# Patient Record
Sex: FEMALE | Race: WHITE
[De-identification: names, ages, dates, MRNs, and addresses within clinical notes are randomized per-mention and may not be internally consistent; named-entity substitution may affect disease eponyms.]

---

## 2021-06-09 PROBLEM — Z00.00 ENCOUNTER FOR PREVENTIVE HEALTH EXAMINATION: Status: ACTIVE | Noted: 2021-06-09

## 2021-06-14 ENCOUNTER — APPOINTMENT (OUTPATIENT)
Dept: OBGYN | Facility: CLINIC | Age: 21
End: 2021-06-14

## 2023-01-16 ENCOUNTER — APPOINTMENT (OUTPATIENT)
Dept: ENDOCRINOLOGY | Facility: CLINIC | Age: 23
End: 2023-01-16
Payer: MEDICAID

## 2023-01-16 ENCOUNTER — TRANSCRIPTION ENCOUNTER (OUTPATIENT)
Age: 23
End: 2023-01-16

## 2023-01-16 VITALS
HEART RATE: 97 BPM | OXYGEN SATURATION: 98 % | SYSTOLIC BLOOD PRESSURE: 120 MMHG | WEIGHT: 132 LBS | DIASTOLIC BLOOD PRESSURE: 80 MMHG | HEIGHT: 58 IN | BODY MASS INDEX: 27.71 KG/M2

## 2023-01-16 DIAGNOSIS — Z13.29 ENCOUNTER FOR SCREENING FOR OTHER SUSPECTED ENDOCRINE DISORDER: ICD-10-CM

## 2023-01-16 LAB — GLUCOSE BLDC GLUCOMTR-MCNC: 117

## 2023-01-16 PROCEDURE — G0108 DIAB MANAGE TRN  PER INDIV: CPT

## 2023-01-16 PROCEDURE — 99203 OFFICE O/P NEW LOW 30 MIN: CPT | Mod: 25

## 2023-01-16 PROCEDURE — 82962 GLUCOSE BLOOD TEST: CPT

## 2023-01-16 RX ORDER — URINE ACETONE TEST STRIPS
STRIP MISCELLANEOUS
Qty: 1 | Refills: 1 | Status: ACTIVE | COMMUNITY
Start: 2023-01-16 | End: 1900-01-01

## 2023-01-16 RX ORDER — LANCETS
EACH MISCELLANEOUS
Qty: 4 | Refills: 1 | Status: ACTIVE | COMMUNITY
Start: 2023-01-16 | End: 1900-01-01

## 2023-01-16 RX ORDER — TERCONAZOLE 4 MG/G
0.4 CREAM VAGINAL
Qty: 45 | Refills: 0 | Status: DISCONTINUED | COMMUNITY
Start: 2022-12-14

## 2023-01-16 RX ORDER — CHOLECALCIFEROL (VITAMIN D3) 25 MCG
25 MCG CAPSULE ORAL
Qty: 30 | Refills: 0 | Status: ACTIVE | COMMUNITY
Start: 2022-08-24

## 2023-01-16 RX ORDER — AZITHROMYCIN 500 MG/1
500 TABLET, FILM COATED ORAL
Qty: 5 | Refills: 0 | Status: DISCONTINUED | COMMUNITY
Start: 2022-09-30

## 2023-01-16 RX ORDER — BLOOD SUGAR DIAGNOSTIC
STRIP MISCELLANEOUS 4 TIMES DAILY
Qty: 4 | Refills: 1 | Status: ACTIVE | COMMUNITY
Start: 2023-01-16 | End: 1900-01-01

## 2023-01-16 NOTE — REVIEW OF SYSTEMS
[Fatigue] : fatigue [Recent Weight Gain (___ Lbs)] : recent weight gain: [unfilled] lbs [Decreased Appetite] : appetite not decreased [Visual Field Defect] : no visual field defect [Blurred Vision] : no blurred vision [Dysphagia] : no dysphagia [Neck Pain] : no neck pain [Dysphonia] : no dysphonia [Chest Pain] : no chest pain [Palpitations] : no palpitations [Constipation] : no constipation [Diarrhea] : no diarrhea [Polyuria] : no polyuria [Dysuria] : no dysuria [Headaches] : no headaches [Tremors] : no tremors [Depression] : no depression [Anxiety] : no anxiety [Polydipsia] : no polydipsia [Swelling] : no swelling

## 2023-01-16 NOTE — CONSULT LETTER
[Dear  ___] : Dear  [unfilled], [Consult Letter:] : I had the pleasure of evaluating your patient, [unfilled]. [Consult Closing:] : Thank you very much for allowing me to participate in the care of this patient.  If you have any questions, please do not hesitate to contact me. [Sincerely,] : Sincerely, [FreeTextEntry1] : At the visit with myself and our dietician, we explained impaired glucose tolerance and gestational diabetes in detail giving her printed information as well. We placed her on a gestational diabetic diet restricting her carbohydrate intake to 15 grams at breakfast. She was given a sample diet and instructed on carbohydrate counting. We instructed her on the use of a blood glucose monitor and she was asked to monitor her blood sugars before breakfast and one hour after each meal. \par I\par f her blood sugars remain in our target goal of 90 or less fasting and below 120 post- prandially, we will continue to follow her closely on diet management. If, however, her blood sugars rise above these numbers, we will, of course, institute drug therapy.\par \par We have asked her to contact us with her blood glucose numbers in two to three days and to schedule an office follow-up in several weeks. \par \par  [FreeTextEntry3] : Katarzyna Rincon, FNP-BC

## 2023-01-16 NOTE — ASSESSMENT
[FreeTextEntry1] : 21 y/o female  with GDM.\par \par Plan: start self blood sugar monitoring 4 times a day along with sending in logs on Thursday. Check A1c and TFTs.\par \par Diet (carbohydrates 15 grams with breakfast, 45 grams with lunch and 60 grams with dinner, 15-30 grams with snacks. Balance with lean proteins, high fiber and low fat diet.)\par \par Exercise daily as tolerated, work up to 20- 30 minutes a day. \par \par Medication, risks and benefits reviewed. ***Glucose testing and insulin/ medication *** administration for glycemic management  \par \par GDM Education:  Discussed the immediate risks to the mother with GDM are an increased incidence of  delivery (~30%), preeclampsia (~20-30%), and polyhydramnios (~20%) which can result in  labor.  The long-term risks to the mother regarding diabetes complications during pregnancy.  Counseling on diet, and exercise was given.  Fetal/Infant risks discussed and include macrosomia,  risk for shoulder dystocia/Erb's palsy, clavicular fractures, fetal distress, low APGAR scores, and even birth asphyxia when unrecognized, respiratory distress syndrome may occur in up to 31% of infants while cardiac septal hypertrophy may be seen in 35-40%., with extremely poor glucose control, there is also an increased risk of fetal mortality due to fetal acidemia and hypoxia. Common metabolic abnormalities in the infant of a diabetic mother include  hypoglycemia, hypocalcemia, hyperbilirubinemia and polycythemia.  hypoglycemia is common in women in suboptimal glycemic control. Also the long-term sequelae of hyperglycemia during pregnancy for offspring and increased risk of adolescent obesity and of Type 2 diabetes. \par \par Breastfeeding encouraged. Fetal surveillance as per OB recommendations (fetal ultrasound, fetal movement records, nonstress test, biophysical profile)\par \par Follow-up in 1 to 2 weeks; call if develop other problems/ questions. Call to report hypoglycemia or sustained hyperglycemia.\par

## 2023-01-16 NOTE — HISTORY OF PRESENT ILLNESS
[FreeTextEntry1] : Quality: GDM\par Severity: moderate \par Onset: 28 weeks gestation \par Glucose tolerance test results: 22  2 hr gtt 85/180/125\par History of PCOS: Yes, monthly menses \par Previous history of GDM: No\par Family History: Father Type 2 DM, Maternal Grandmother - Type 2 DM, Maternal Grand Aunt - Type 1 DM\par \par \par Self blood sugar monitoring: none\par Current  - about ate 2 hours ago\par \par Notes: OB: [facility], Dr. Cobian\par  \par LMP: 6/10/22\par EDC: 3/17/23\par  31 weeks gestation\par Last sonogram: 22 "everything was looking good" weighed about 2 lbs (boy)  \par \par Planning to breast feed: No\par \par PMH: \par PCOS

## 2023-01-16 NOTE — PHYSICAL EXAM
[Alert] : alert [Well Nourished] : well nourished [No Acute Distress] : no acute distress [Well Developed] : well developed [Normal Sclera/Conjunctiva] : normal sclera/conjunctiva [No Proptosis] : no proptosis [Normal Hearing] : hearing was normal [No LAD] : no lymphadenopathy [Thyroid Not Enlarged] : the thyroid was not enlarged [No Thyroid Nodules] : no palpable thyroid nodules [No Respiratory Distress] : no respiratory distress [No Accessory Muscle Use] : no accessory muscle use [Normal Rate and Effort] : normal respiratory rate and effort [Clear to Auscultation] : lungs were clear to auscultation bilaterally [Normal S1, S2] : normal S1 and S2 [Normal Rate] : heart rate was normal [Regular Rhythm] : with a regular rhythm [No Edema] : no peripheral edema [Normal Bowel Sounds] : normal bowel sounds [No Stigmata of Cushings Syndrome] : no stigmata of Cushings Syndrome [Normal Gait] : normal gait [No Rash] : no rash [Acanthosis Nigricans] : no acanthosis nigricans [No Tremors] : no tremors [Oriented x3] : oriented to person, place, and time [Normal Affect] : the affect was normal [Normal Insight/Judgement] : insight and judgment were intact [Normal Mood] : the mood was normal [de-identified] : gravid

## 2023-01-17 ENCOUNTER — TRANSCRIPTION ENCOUNTER (OUTPATIENT)
Age: 23
End: 2023-01-17

## 2023-01-19 ENCOUNTER — TRANSCRIPTION ENCOUNTER (OUTPATIENT)
Age: 23
End: 2023-01-19

## 2023-01-24 ENCOUNTER — TRANSCRIPTION ENCOUNTER (OUTPATIENT)
Age: 23
End: 2023-01-24

## 2023-01-25 ENCOUNTER — TRANSCRIPTION ENCOUNTER (OUTPATIENT)
Age: 23
End: 2023-01-25

## 2023-01-30 ENCOUNTER — APPOINTMENT (OUTPATIENT)
Dept: ENDOCRINOLOGY | Facility: CLINIC | Age: 23
End: 2023-01-30
Payer: MEDICAID

## 2023-01-30 PROCEDURE — G0108 DIAB MANAGE TRN  PER INDIV: CPT

## 2023-02-01 ENCOUNTER — TRANSCRIPTION ENCOUNTER (OUTPATIENT)
Age: 23
End: 2023-02-01

## 2023-02-03 ENCOUNTER — TRANSCRIPTION ENCOUNTER (OUTPATIENT)
Age: 23
End: 2023-02-03

## 2023-02-09 ENCOUNTER — TRANSCRIPTION ENCOUNTER (OUTPATIENT)
Age: 23
End: 2023-02-09

## 2023-02-15 ENCOUNTER — APPOINTMENT (OUTPATIENT)
Dept: ENDOCRINOLOGY | Facility: CLINIC | Age: 23
End: 2023-02-15
Payer: MEDICAID

## 2023-02-15 VITALS
WEIGHT: 135 LBS | HEIGHT: 58 IN | HEART RATE: 83 BPM | BODY MASS INDEX: 28.34 KG/M2 | SYSTOLIC BLOOD PRESSURE: 105 MMHG | DIASTOLIC BLOOD PRESSURE: 70 MMHG | OXYGEN SATURATION: 97 %

## 2023-02-15 DIAGNOSIS — O24.419 GESTATIONAL DIABETES MELLITUS IN PREGNANCY, UNSPECIFIED CONTROL: ICD-10-CM

## 2023-02-15 LAB — GLUCOSE BLDC GLUCOMTR-MCNC: 86

## 2023-02-15 PROCEDURE — 99213 OFFICE O/P EST LOW 20 MIN: CPT | Mod: 25

## 2023-02-15 PROCEDURE — 82962 GLUCOSE BLOOD TEST: CPT

## 2023-02-15 NOTE — REVIEW OF SYSTEMS
[Fatigue] : no fatigue [Recent Weight Gain (___ Lbs)] : no recent weight gain [Recent Weight Loss (___ Lbs)] : no recent weight loss [Visual Field Defect] : no visual field defect [Blurred Vision] : no blurred vision [Dysphagia] : no dysphagia [Neck Pain] : no neck pain [Chest Pain] : no chest pain [Palpitations] : no palpitations [Shortness Of Breath] : no shortness of breath [Nausea] : no nausea [Constipation] : no constipation [Vomiting] : no vomiting [Diarrhea] : no diarrhea

## 2023-02-15 NOTE — PHYSICAL EXAM
[Alert] : alert [Normal Sclera/Conjunctiva] : normal sclera/conjunctiva [Normal Hearing] : hearing was normal [No Neck Mass] : no neck mass was observed [Thyroid Not Enlarged] : the thyroid was not enlarged [No Respiratory Distress] : no respiratory distress [No Accessory Muscle Use] : no accessory muscle use [Normal S1, S2] : normal S1 and S2 [No Stigmata of Cushings Syndrome] : no stigmata of Cushings Syndrome [Normal Gait] : normal gait [Oriented x3] : oriented to person, place, and time

## 2023-02-15 NOTE — ASSESSMENT
[Importance of Diet and Exercise] : importance of diet and exercise to improve glycemic control, achieve weight loss and improve cardiovascular health [FreeTextEntry1] : 21 y/o female  with GDM.\par \par Plan: Continue to check sugars 4x's a day and send logs weekly \par \par Diet (carbohydrates 15 grams with breakfast, 45 grams with lunch and 60 grams with dinner, 15-30 grams with snacks. Balance with lean proteins, high fiber and low fat diet.)\par \par Exercise daily as tolerated, work up to 20- 30 minutes a day. \par \par Medication, risks and benefits reviewed. ***Glucose testing and insulin/ medication *** administration for glycemic management \par \par GDM Education: Discussed the immediate risks to the mother with GDM are an increased incidence of  delivery (~30%), preeclampsia (~20-30%), and polyhydramnios (~20%) which can result in  labor. The long-term risks to the mother regarding diabetes complications during pregnancy. Counseling on diet, and exercise was given. Fetal/Infant risks discussed and include macrosomia, risk for shoulder dystocia/Erb's palsy, clavicular fractures, fetal distress, low APGAR scores, and even birth asphyxia when unrecognized, respiratory distress syndrome may occur in up to 31% of infants while cardiac septal hypertrophy may be seen in 35-40%., with extremely poor glucose control, there is also an increased risk of fetal mortality due to fetal acidemia and hypoxia. Common metabolic abnormalities in the infant of a diabetic mother include  hypoglycemia, hypocalcemia, hyperbilirubinemia and polycythemia.  hypoglycemia is common in women in suboptimal glycemic control. Also the long-term sequelae of hyperglycemia during pregnancy for offspring and increased risk of adolescent obesity and of Type 2 diabetes. \par \par Breastfeeding encouraged. Fetal surveillance as per OB recommendations (fetal ultrasound, fetal movement records, nonstress test, biophysical profile)\par \par -Still did not go for blood work for tft's and HgbA1C\par \par Follow-up in 1 to 2 weeks; call if develop other problems/ questions. Call to report hypoglycemia or sustained hyperglycemia.

## 2023-02-15 NOTE — HISTORY OF PRESENT ILLNESS
[FreeTextEntry1] : Quality: GDM\par Severity: moderate \par Onset: 28 weeks gestation \par Glucose tolerance test results: 22 2 hr gtt 85/180/125\par History of PCOS: Yes, monthly menses \par Previous history of GDM: No\par Family History: Father Type 2 DM, Maternal Grandmother - Type 2 DM, Maternal Grand Aunt - Type 1 DM\par \par \par Self blood sugar monitorin x's a day \par Current FS 86 --> Fasting  \par Currently Diet controlled\par \par  2/9 ketones small \par Fasting 88  \par Breakfast 94 \par Lunch 97 \par Dinner 113 \par  \par 2/10 ketones small \par Fasting 85 \par Breakfast 100 \par Lunch 95 \par Dinner 118 \par  \par 2/11 ketones small \par Fasting didn’t get to measure  \par Breakfast 89 \par Lunch didn’t get to measure \par Dinner 112 \par  \par 2/12 ketones small \par fasting didn’t get to measure  \par Breakfast 92 \par Lunch 104 \par Dinner 116 \par  \par 2/13 ketones small \par Fasting 87 \par Breakfast 96 \par Lunch 102 \par Dinner didn’t get to measure  \par  \par 2/14 ketones small \par Fasting 85 \par Breakfast 100 \par Lunch 95 \par Dinner 117 \par  \par \par Weight- Gained 35 pounds this pregnancy \par \par Notes: OB: [facility], Dr. Cobian\par  \par LMP: 6/10/22\par EDC: 3/17/23\par 35 weeks gestation\par Last sonogram: 2023--> 30th %\par Planning to breast feed: No\par \par PMH: \par PCOS \par

## 2023-02-21 ENCOUNTER — TRANSCRIPTION ENCOUNTER (OUTPATIENT)
Age: 23
End: 2023-02-21

## 2023-03-17 ENCOUNTER — APPOINTMENT (OUTPATIENT)
Dept: ENDOCRINOLOGY | Facility: CLINIC | Age: 23
End: 2023-03-17

## 2023-03-28 ENCOUNTER — APPOINTMENT (OUTPATIENT)
Dept: ENDOCRINOLOGY | Facility: CLINIC | Age: 23
End: 2023-03-28